# Patient Record
(demographics unavailable — no encounter records)

---

## 2025-07-11 NOTE — DATA REVIEWED
[FreeTextEntry1] : Audiogram 7/10/25, personally reviewed: could not condition to tonal stimuli, SDT 35dB with poor reliability, Type C tymp right, Type As tymp left (movement limited) TEOAE could not be obtained due to movement

## 2025-07-11 NOTE — ASSESSMENT
[FreeTextEntry1] : I personally reviewed and interpreted the audiogram. I explained the results of the audiogram to the family. Unable to obtain adequate audiogram today. SDT suggesting mild hearing loss at least better ear, however poor reliability. We discussed an additional attempt at behavioral audiogram. If still unable to complete will likely need sedated ABR. Follow up 2-3 weeks for repeat audio. Parents express understanding.

## 2025-07-11 NOTE — CONSULT LETTER
[Dear  ___] : Dear  [unfilled], [Courtesy Letter:] : I had the pleasure of seeing your patient, [unfilled], in my office today. [Please see my note below.] : Please see my note below. [Sincerely,] : Sincerely, [FreeTextEntry3] : Darlin Holguin M.D. [place] [address] Tel [phone] Fax [phone]

## 2025-07-11 NOTE — CONSULT LETTER
[Dear  ___] : Dear  [unfilled], [Courtesy Letter:] : I had the pleasure of seeing your patient, [unfilled], in my office today. [Please see my note below.] : Please see my note below. [Sincerely,] : Sincerely, [FreeTextEntry3] : Darlin Holguin M.D. [place] [address] Tel [phone] Fax [phone]

## 2025-07-11 NOTE — REASON FOR VISIT
[Initial Evaluation] : an initial evaluation for [Parents] : parents [FreeTextEntry2] : hearing and speech concerns

## 2025-07-11 NOTE — HISTORY OF PRESENT ILLNESS
[de-identified] : Marcelle is a 2 year old female who presents for initial evaluation of hearing loss and speech delay. Seen today with who provides history. Suspected autism, no true evaluation yet.  Parents are concerned about hearing. They feel she doesn't respond to her name at times or loud sounds.  They believe she had a hearing test at birth that was normal, however unsure as she was born in [facility].  No other hearing tests.   Does not listen to commands.  Only speaking a few words. Undergoing evaluation for EI. Has not started yet.  Yells/screams very loudly, cannot express herself.   No difficulty breathing. No difficulty swallowing.  No snoring or throat infections.  No other otolaryngology concerns today.

## 2025-07-11 NOTE — HISTORY OF PRESENT ILLNESS
[de-identified] : Marcelle is a 2 year old female who presents for initial evaluation of hearing loss and speech delay. Seen today with who provides history. Suspected autism, no true evaluation yet.  Parents are concerned about hearing. They feel she doesn't respond to her name at times or loud sounds.  They believe she had a hearing test at birth that was normal, however unsure as she was born in [facility].  No other hearing tests.   Does not listen to commands.  Only speaking a few words. Undergoing evaluation for EI. Has not started yet.  Yells/screams very loudly, cannot express herself.   No difficulty breathing. No difficulty swallowing.  No snoring or throat infections.  No other otolaryngology concerns today.